# Patient Record
Sex: FEMALE | Race: WHITE | NOT HISPANIC OR LATINO | Employment: OTHER | URBAN - METROPOLITAN AREA
[De-identification: names, ages, dates, MRNs, and addresses within clinical notes are randomized per-mention and may not be internally consistent; named-entity substitution may affect disease eponyms.]

---

## 2023-09-17 PROBLEM — T17.928A ASPIRATION OF FOOD: Status: ACTIVE | Noted: 2023-09-17

## 2023-09-17 PROBLEM — I47.29 NSVT (NONSUSTAINED VENTRICULAR TACHYCARDIA) (MULTI): Status: ACTIVE | Noted: 2023-09-17

## 2023-09-17 PROBLEM — N28.1 RENAL CYST: Status: ACTIVE | Noted: 2023-09-17

## 2023-09-17 PROBLEM — D36.9 TUBULAR ADENOMA: Status: ACTIVE | Noted: 2023-09-17

## 2023-09-17 PROBLEM — H61.23 BILATERAL IMPACTED CERUMEN: Status: ACTIVE | Noted: 2023-09-17

## 2023-09-17 PROBLEM — I25.10 CAD (CORONARY ARTERY DISEASE): Status: ACTIVE | Noted: 2023-09-17

## 2023-09-17 PROBLEM — K92.1 MELENA: Status: ACTIVE | Noted: 2023-09-17

## 2023-09-17 PROBLEM — R20.9 SENSATION ALTERATION, LATE EFFECT OF CEREBROVASCULAR DISEASE: Status: ACTIVE | Noted: 2023-09-17

## 2023-09-17 PROBLEM — Z95.0 CARDIAC PACEMAKER IN SITU: Status: ACTIVE | Noted: 2023-09-17

## 2023-09-17 PROBLEM — H90.3 ASYMMETRICAL SENSORINEURAL HEARING LOSS: Status: ACTIVE | Noted: 2023-09-17

## 2023-09-17 PROBLEM — I07.1 TRICUSPID VALVE REGURGITATION: Status: ACTIVE | Noted: 2023-09-17

## 2023-09-17 PROBLEM — J31.0 CHRONIC RHINITIS: Status: ACTIVE | Noted: 2023-09-17

## 2023-09-17 PROBLEM — E78.2 MIXED HYPERLIPIDEMIA: Status: ACTIVE | Noted: 2023-09-17

## 2023-09-17 PROBLEM — I34.0 MITRAL VALVE REGURGITATION: Status: ACTIVE | Noted: 2023-09-17

## 2023-09-17 PROBLEM — I69.993 ATAXIA, LATE EFFECT OF CEREBROVASCULAR DISEASE: Status: ACTIVE | Noted: 2023-09-17

## 2023-09-17 PROBLEM — R31.0 HEMATURIA, GROSS: Status: ACTIVE | Noted: 2023-09-17

## 2023-09-17 PROBLEM — K29.50 ANTRITIS (STOMACH): Status: ACTIVE | Noted: 2023-09-17

## 2023-09-17 PROBLEM — I63.9 CVA (CEREBRAL VASCULAR ACCIDENT) (MULTI): Status: ACTIVE | Noted: 2023-09-17

## 2023-09-17 PROBLEM — I10 BENIGN ESSENTIAL HYPERTENSION: Status: ACTIVE | Noted: 2023-09-17

## 2023-09-17 PROBLEM — I48.0 PAF (PAROXYSMAL ATRIAL FIBRILLATION) (MULTI): Status: ACTIVE | Noted: 2023-09-17
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
## 2023-09-17 PROBLEM — I42.9 CARDIOMYOPATHY (MULTI): Status: ACTIVE | Noted: 2023-09-17

## 2023-09-17 PROBLEM — H61.20 CERUMEN IMPACTION: Status: ACTIVE | Noted: 2023-09-17

## 2023-09-17 PROBLEM — K57.90 DIVERTICULOSIS: Status: ACTIVE | Noted: 2023-09-17

## 2023-09-17 PROBLEM — R05.3 CHRONIC COUGH: Status: ACTIVE | Noted: 2023-09-17

## 2023-09-17 PROBLEM — I69.998 SENSATION ALTERATION, LATE EFFECT OF CEREBROVASCULAR DISEASE: Status: ACTIVE | Noted: 2023-09-17

## 2023-09-17 PROBLEM — I49.5 SINUS NODE DYSFUNCTION (MULTI): Status: ACTIVE | Noted: 2023-09-17

## 2023-09-17 PROBLEM — Z86.73 HISTORY OF CVA (CEREBROVASCULAR ACCIDENT): Status: ACTIVE | Noted: 2023-09-17

## 2023-09-17 RX ORDER — PANTOPRAZOLE SODIUM 40 MG/1
1 TABLET, DELAYED RELEASE ORAL DAILY PRN
COMMUNITY
End: 2023-10-18 | Stop reason: ALTCHOICE

## 2023-09-17 RX ORDER — DILTIAZEM HYDROCHLORIDE 240 MG/1
1 CAPSULE, EXTENDED RELEASE ORAL DAILY
COMMUNITY
Start: 2021-09-03 | End: 2024-01-04 | Stop reason: WASHOUT

## 2023-09-17 RX ORDER — CITALOPRAM 20 MG/1
1 TABLET, FILM COATED ORAL DAILY
COMMUNITY
Start: 2022-09-27 | End: 2024-05-16 | Stop reason: WASHOUT

## 2023-09-17 RX ORDER — MONTELUKAST SODIUM 10 MG/1
1 TABLET ORAL DAILY
COMMUNITY

## 2023-09-17 RX ORDER — NEOMYCIN SULFATE, POLYMYXIN B SULFATE, HYDROCORTISONE 3.5; 10000; 1 MG/ML; [USP'U]/ML; MG/ML
SOLUTION/ DROPS AURICULAR (OTIC)
COMMUNITY
Start: 2021-10-22 | End: 2023-10-18 | Stop reason: ALTCHOICE

## 2023-09-17 RX ORDER — SIMVASTATIN 20 MG/1
TABLET, FILM COATED ORAL
COMMUNITY
End: 2023-11-20

## 2023-09-17 RX ORDER — METOPROLOL SUCCINATE 200 MG/1
TABLET, EXTENDED RELEASE ORAL
COMMUNITY
Start: 2015-09-22 | End: 2023-11-20

## 2023-09-17 RX ORDER — CALCIUM CARBONATE/VITAMIN D3 500-10/5ML
1 LIQUID (ML) ORAL DAILY
COMMUNITY
End: 2024-05-16 | Stop reason: WASHOUT

## 2023-09-17 RX ORDER — TEMAZEPAM 7.5 MG/1
CAPSULE ORAL
COMMUNITY
End: 2023-10-18 | Stop reason: ALTCHOICE

## 2023-10-18 ENCOUNTER — OFFICE VISIT (OUTPATIENT)
Dept: CARDIOLOGY | Facility: CLINIC | Age: 88
End: 2023-10-18
Payer: MEDICARE

## 2023-10-18 VITALS
DIASTOLIC BLOOD PRESSURE: 82 MMHG | SYSTOLIC BLOOD PRESSURE: 132 MMHG | WEIGHT: 166.7 LBS | HEIGHT: 63 IN | BODY MASS INDEX: 29.54 KG/M2 | HEART RATE: 80 BPM

## 2023-10-18 DIAGNOSIS — I49.5 SINUS NODE DYSFUNCTION (MULTI): ICD-10-CM

## 2023-10-18 DIAGNOSIS — I10 BENIGN ESSENTIAL HYPERTENSION: ICD-10-CM

## 2023-10-18 DIAGNOSIS — Z86.73 HISTORY OF CVA (CEREBROVASCULAR ACCIDENT): ICD-10-CM

## 2023-10-18 DIAGNOSIS — I47.19 ATRIAL TACHYCARDIA, PAROXYSMAL (CMS-HCC): ICD-10-CM

## 2023-10-18 DIAGNOSIS — Z79.899 HIGH RISK MEDICATION USE: ICD-10-CM

## 2023-10-18 DIAGNOSIS — E78.2 MIXED HYPERLIPIDEMIA: ICD-10-CM

## 2023-10-18 DIAGNOSIS — I48.0 PAF (PAROXYSMAL ATRIAL FIBRILLATION) (MULTI): ICD-10-CM

## 2023-10-18 DIAGNOSIS — I47.29 NSVT (NONSUSTAINED VENTRICULAR TACHYCARDIA) (MULTI): ICD-10-CM

## 2023-10-18 DIAGNOSIS — I34.0 MITRAL VALVE INSUFFICIENCY, UNSPECIFIED ETIOLOGY: ICD-10-CM

## 2023-10-18 DIAGNOSIS — I07.1 TRICUSPID VALVE INSUFFICIENCY, UNSPECIFIED ETIOLOGY: ICD-10-CM

## 2023-10-18 DIAGNOSIS — I25.5 ISCHEMIC CARDIOMYOPATHY: ICD-10-CM

## 2023-10-18 PROCEDURE — 1036F TOBACCO NON-USER: CPT | Performed by: INTERNAL MEDICINE

## 2023-10-18 PROCEDURE — 3075F SYST BP GE 130 - 139MM HG: CPT | Performed by: INTERNAL MEDICINE

## 2023-10-18 PROCEDURE — 3079F DIAST BP 80-89 MM HG: CPT | Performed by: INTERNAL MEDICINE

## 2023-10-18 PROCEDURE — 1159F MED LIST DOCD IN RCRD: CPT | Performed by: INTERNAL MEDICINE

## 2023-10-18 PROCEDURE — 99214 OFFICE O/P EST MOD 30 MIN: CPT | Performed by: INTERNAL MEDICINE

## 2023-10-18 NOTE — PROGRESS NOTES
CARDIOLOGY OFFICE VISIT      CHIEF COMPLAINT  Chief Complaint   Patient presents with    Follow-up     1 year        HISTORY OF PRESENT ILLNESS  The patient is an 88-year-old  female with past medical history significant for paroxysmal atrial fibrillation, nonsustained ventricular tachycardia, cerebrovascular accident with multiple cerebral emboli, sick sinus syndrome, status post pacemaker, hypertension, hyperlipidemia, who presents for  followup cardiovascular care.      Denies chest pain, dyspnea, palpitations, nausea, vomiting, dizziness, near-syncope, henrry syncope, edema, bleeding. No formal exercise program.    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      PAST SURGICAL HISTORY: Pacemaker.      FAMILY HISTORY: No known premature coronary artery disease.      SOCIAL HISTORY: Denies tobacco, drug or alcohol use.      REVIEW OF SYSTEMS: Reviewed and are negative, noncontributory, or as previously  mentioned x12 systems.       ASSESSMENT:   Paroxysmal atrial fibrillation.  Cardiomyopathy EF 35-40%  Mitral valve regurgitation  Tricuspid valve regurgitation  Sick sinus syndrome, status post pacemaker.  Paroxysmal atrial tachycardia.  Nonsustained ventricular tachycardia.   History of multiple embolic cerebrovascular accidents.  Hypertension.  Hyperlipidemia.  High-risk medication management.      Recommendations:  Patient appears to be stable from a cardiac standpoint. No evidence of heart failure. No symptoms of angina. No symptomatic arrhythmia. No symptomatic valvular heart disease. Continue medical therapy as prescribed. Follow-up with myself in 1 year. Routine lab work through PCP.    Please excuse any errors in grammar or translation related to this dictation. Voice recognition software was utilized to prepare this document    Recent Cardiovascular Testing:  The following results have been reviewed and updated.     MPL: 8/6/19  1.Normal    2.EF 49%    Holter: 7/12/12  1. Brief episodes of atrial tachycardia.  2. Nonsustained ventricular tachycardia.     Labs: 10/15/23  1.T C 205, HDL 85, LDL 93, Trig 133    Echo: 9/3/21  1. EF 35-40%.  2. Moderate mitral valve regurgitation  3. Moderate tricuspid regurgitation  4. Mild aortic valve regurgitation         VITALS  Vitals:    10/18/23 1332   BP: 132/82   Pulse: 80     Wt Readings from Last 4 Encounters:   11/21/22 74.8 kg (165 lb)   10/19/22 75.3 kg (166 lb)   05/18/22 73.5 kg (162 lb)   10/20/21 70.3 kg (155 lb)       PHYSICAL EXAM:  GENERAL:  Well developed, well nourished, in no acute distress.  CHEST:  Symmetric and nontender.  NEURO/PSYCH:  Alert and oriented times three with approppriate behavior and responses.  NECK:  Supple, no JVD, no bruit.  LUNGS:  Clear to auscultation bilaterally, normal respiratory effort.  HEART:  Rate and rhythm irregularly irregular with no evident murmur, no gallop appreciated.                   There are no rubs, clicks or heaves.  EXTREMITIES:  Warm with good color, no clubbing or cyanosis.  There is  trace bilateral pretibia edema noted.  PERIPHERAL VASCULAR:  Pulses present and equally palpable; 2+ throughout.    ASSESSMENT AND PLAN  Problem List Items Addressed This Visit       Benign essential hypertension    Cardiomyopathy (CMS/HCC)    History of CVA (cerebrovascular accident)    Mixed hyperlipidemia    NSVT (nonsustained ventricular tachycardia) (CMS/HCC)    PAF (paroxysmal atrial fibrillation) (CMS/HCC)    Sinus node dysfunction (CMS/HCC)    Mitral valve regurgitation    Tricuspid valve regurgitation    Atrial tachycardia, paroxysmal    High risk medication use       Past Medical History  Past Medical History:   Diagnosis Date    Cardiomyopathy (CMS/HCC)     Hyperlipidemia     Hypertension     Mitral valve regurgitation     NSVT (nonsustained ventricular tachycardia) (CMS/HCC)     Other specified health status     No pertinent past medical history    PAF  "(paroxysmal atrial fibrillation) (CMS/HCC)     Personal history of other diseases of urinary system     History of hematuria    Sick sinus syndrome (CMS/HCC)     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    Sinus node dysfunction (CMS/HCC)     Tricuspid valve regurgitation        Social History  Social History     Tobacco Use    Smoking status: Never    Smokeless tobacco: Never   Substance Use Topics    Alcohol use: Never    Drug use: Never       Family History     Family History   Problem Relation Name Age of Onset    Hypertension Mother      Coronary artery disease Father      Hypertension Father      Other (arteriosclerotic cardiovascular disease) Father      Diabetes Brother          Allergies:  Allergies   Allergen Reactions    Ace Inhibitors Cough        Outpatient Medications:  Current Outpatient Medications   Medication Instructions    citalopram (CeleXA) 20 mg tablet 1 tablet, oral, Daily    dilTIAZem ER (Tiazac) 240 mg 24 hr capsule 1 capsule, oral, Daily    magnesium oxide 400 mg magnesium capsule 1 tablet, oral, Daily    metoprolol succinate XL (Toprol-XL) 200 mg 24 hr tablet oral    montelukast (Singulair) 10 mg tablet 1 tablet, oral, Daily    rivaroxaban (Xarelto) 15 mg tablet 1 tablet, oral, Daily    simvastatin (Zocor) 20 mg tablet oral        Recent Lab Results:    CMP:    Lab Results   Component Value Date     11/04/2022    K 4.2 11/04/2022     11/04/2022    CO2 31 11/04/2022    BUN 26 (H) 11/04/2022    CREATININE 1.54 (H) 11/04/2022    GLUCOSE 97 11/04/2022    CALCIUM 9.6 11/04/2022       Magnesium:    No results found for: \"MG\"    Lipid Profile:    Lab Results   Component Value Date    TRIG 116 11/04/2022    HDL 76.0 11/04/2022    LDLDIRECT 76 11/04/2022       TSH:    Lab Results   Component Value Date    TSH 4.20 (H) 11/04/2022       BNP:   No results found for: \"BNP\"     PT/INR:    Lab Results   Component Value Date    PROTIME 14.8 (H) 09/02/2021    INR 1.3 (H) 09/02/2021 " "      HgBA1c:    No results found for: \"HGBA1C\"    BMP:  Lab Results   Component Value Date     11/04/2022     05/19/2022     09/03/2021    K 4.2 11/04/2022    K 4.0 05/19/2022    K 3.9 09/03/2021     11/04/2022     05/19/2022     09/03/2021    CO2 31 11/04/2022    CO2 31 05/19/2022    CO2 27 09/03/2021    BUN 26 (H) 11/04/2022    BUN 25 (H) 05/19/2022    BUN 22 09/03/2021    CREATININE 1.54 (H) 11/04/2022    CREATININE 1.55 (H) 05/19/2022    CREATININE 1.21 (H) 09/03/2021       CBC:  Lab Results   Component Value Date    WBC 6.2 11/04/2022    WBC 6.2 05/19/2022    WBC 5.8 09/03/2021    RBC 4.46 11/04/2022    RBC 4.66 05/19/2022    RBC 4.25 09/03/2021    HGB 13.8 11/04/2022    HGB 14.6 05/19/2022    HGB 13.0 09/03/2021    HCT 44.3 11/04/2022    HCT 45.5 05/19/2022    HCT 41.7 09/03/2021    MCV 99 11/04/2022    MCV 98 05/19/2022    MCV 98 09/03/2021    MCHC 31.2 (L) 11/04/2022    MCHC 32.1 05/19/2022    MCHC 31.2 (L) 09/03/2021    RDW 13.3 11/04/2022    RDW 13.3 05/19/2022    RDW 13.2 09/03/2021     11/04/2022     05/19/2022     09/03/2021       Cardiac Enzymes:    No results found for: \"TROPHS\"    Hepatic Function Panel:    Lab Results   Component Value Date    ALKPHOS 98 11/04/2022    ALT 18 11/04/2022    AST 19 11/04/2022    PROT 6.6 11/04/2022    BILITOT 0.9 11/04/2022    BILIDIR 0.1 05/09/2019           Dorian Kern, DO        "

## 2023-11-17 DIAGNOSIS — I10 BENIGN ESSENTIAL HYPERTENSION: ICD-10-CM

## 2023-11-17 DIAGNOSIS — E78.2 MIXED HYPERLIPIDEMIA: ICD-10-CM

## 2023-11-17 DIAGNOSIS — I48.0 PAF (PAROXYSMAL ATRIAL FIBRILLATION) (MULTI): ICD-10-CM

## 2023-11-20 RX ORDER — SIMVASTATIN 20 MG/1
20 TABLET, FILM COATED ORAL NIGHTLY
Qty: 90 TABLET | Refills: 3 | Status: SHIPPED | OUTPATIENT
Start: 2023-11-20 | End: 2024-05-16 | Stop reason: WASHOUT

## 2023-11-20 RX ORDER — DILTIAZEM HYDROCHLORIDE 240 MG/1
240 CAPSULE, COATED, EXTENDED RELEASE ORAL DAILY
Qty: 90 CAPSULE | Refills: 3 | Status: SHIPPED | OUTPATIENT
Start: 2023-11-20

## 2023-11-20 RX ORDER — METOPROLOL SUCCINATE 200 MG/1
200 TABLET, EXTENDED RELEASE ORAL NIGHTLY
Qty: 90 TABLET | Refills: 3 | Status: SHIPPED | OUTPATIENT
Start: 2023-11-20

## 2023-11-20 RX ORDER — RIVAROXABAN 15 MG/1
15 TABLET, FILM COATED ORAL DAILY
Qty: 90 TABLET | Refills: 3 | Status: SHIPPED | OUTPATIENT
Start: 2023-11-20

## 2023-12-04 ENCOUNTER — HOSPITAL ENCOUNTER (OUTPATIENT)
Dept: CARDIOLOGY | Facility: HOSPITAL | Age: 88
Discharge: HOME | End: 2023-12-04
Payer: MEDICARE

## 2023-12-04 DIAGNOSIS — I49.5 SICK SINUS SYNDROME (MULTI): ICD-10-CM

## 2023-12-04 DIAGNOSIS — Z95.0 PACEMAKER: Primary | ICD-10-CM

## 2023-12-04 DIAGNOSIS — Z95.0 PACEMAKER: ICD-10-CM

## 2023-12-04 PROCEDURE — 93296 REM INTERROG EVL PM/IDS: CPT

## 2023-12-04 PROCEDURE — 93294 REM INTERROG EVL PM/LDLS PM: CPT | Performed by: INTERNAL MEDICINE

## 2024-01-04 ENCOUNTER — TELEPHONE (OUTPATIENT)
Dept: CARDIOLOGY | Facility: CLINIC | Age: 89
End: 2024-01-04
Payer: MEDICARE

## 2024-01-04 DIAGNOSIS — I48.0 PAF (PAROXYSMAL ATRIAL FIBRILLATION) (MULTI): ICD-10-CM

## 2024-01-04 DIAGNOSIS — I10 BENIGN ESSENTIAL HYPERTENSION: ICD-10-CM

## 2024-01-04 NOTE — TELEPHONE ENCOUNTER
Called patient and instructed her to call back with the correct name of the pharmacy she would like us to use.

## 2024-01-04 NOTE — TELEPHONE ENCOUNTER
"Pt stopped in office and filled out a refill form.  Pt is requesting refill on   Diltiazem 240 mg 1 daily  Metoprolol 200 mg daily  Montelukast 10 mg daily  Xarelto 15 mg 1 daily  Pt put on the paperwork for Rx to be sent to [facility].  Along with the Rx request is 1 sheet letter from [employer].  In this letter it states \"you should begin using [facility] pharmacies to fill your prescriptions\".  It advises pt's to \"look in your pharmacy directory or by calling our Customer Care department at the number a the end of this letter\".  We do not have a full copy of this letter so the number is not provided to the office.  Pt will need to contact Marco Vasco to find out what pharmacy she needs to go through.    Pt put contact number of [phone]. Attempted to contact pt 2 times.  No answer and vm is not set up.  Routed to Maile SCOTT RN.  "

## 2024-03-12 ENCOUNTER — HOSPITAL ENCOUNTER (OUTPATIENT)
Dept: CARDIOLOGY | Facility: HOSPITAL | Age: 89
Discharge: HOME | End: 2024-03-12
Payer: MEDICARE

## 2024-03-12 DIAGNOSIS — I49.5 SICK SINUS SYNDROME (MULTI): ICD-10-CM

## 2024-03-12 DIAGNOSIS — Z95.0 PACEMAKER: ICD-10-CM

## 2024-03-12 PROCEDURE — 93294 REM INTERROG EVL PM/LDLS PM: CPT | Performed by: INTERNAL MEDICINE

## 2024-03-12 PROCEDURE — 93296 REM INTERROG EVL PM/IDS: CPT

## 2024-05-16 ENCOUNTER — HOSPITAL ENCOUNTER (OUTPATIENT)
Dept: CARDIOLOGY | Facility: HOSPITAL | Age: 89
Discharge: HOME | End: 2024-05-16
Payer: MEDICARE

## 2024-05-16 ENCOUNTER — OFFICE VISIT (OUTPATIENT)
Dept: CARDIOLOGY | Facility: CLINIC | Age: 89
End: 2024-05-16
Payer: MEDICARE

## 2024-05-16 VITALS
BODY MASS INDEX: 29.59 KG/M2 | DIASTOLIC BLOOD PRESSURE: 64 MMHG | SYSTOLIC BLOOD PRESSURE: 122 MMHG | WEIGHT: 167 LBS | HEIGHT: 63 IN | HEART RATE: 75 BPM

## 2024-05-16 DIAGNOSIS — I34.0 MITRAL VALVE INSUFFICIENCY, UNSPECIFIED ETIOLOGY: ICD-10-CM

## 2024-05-16 DIAGNOSIS — I10 BENIGN ESSENTIAL HYPERTENSION: ICD-10-CM

## 2024-05-16 DIAGNOSIS — I47.29 NSVT (NONSUSTAINED VENTRICULAR TACHYCARDIA) (MULTI): ICD-10-CM

## 2024-05-16 DIAGNOSIS — Z95.0 PACEMAKER: ICD-10-CM

## 2024-05-16 DIAGNOSIS — I42.9 CARDIOMYOPATHY, UNSPECIFIED TYPE (MULTI): ICD-10-CM

## 2024-05-16 DIAGNOSIS — I48.0 PAF (PAROXYSMAL ATRIAL FIBRILLATION) (MULTI): ICD-10-CM

## 2024-05-16 DIAGNOSIS — Z95.0 CARDIAC PACEMAKER IN SITU: Primary | ICD-10-CM

## 2024-05-16 DIAGNOSIS — I07.1 TRICUSPID VALVE INSUFFICIENCY, UNSPECIFIED ETIOLOGY: ICD-10-CM

## 2024-05-16 DIAGNOSIS — Z79.899 HIGH RISK MEDICATION USE: ICD-10-CM

## 2024-05-16 DIAGNOSIS — E78.2 MIXED HYPERLIPIDEMIA: ICD-10-CM

## 2024-05-16 DIAGNOSIS — I49.5 SINUS NODE DYSFUNCTION (MULTI): ICD-10-CM

## 2024-05-16 DIAGNOSIS — I47.19 ATRIAL TACHYCARDIA, PAROXYSMAL (CMS-HCC): ICD-10-CM

## 2024-05-16 DIAGNOSIS — I49.5 SICK SINUS SYNDROME (MULTI): ICD-10-CM

## 2024-05-16 DIAGNOSIS — I25.10 CORONARY ARTERY DISEASE INVOLVING NATIVE CORONARY ARTERY OF NATIVE HEART WITHOUT ANGINA PECTORIS: ICD-10-CM

## 2024-05-16 PROCEDURE — 3078F DIAST BP <80 MM HG: CPT | Performed by: NURSE PRACTITIONER

## 2024-05-16 PROCEDURE — 1036F TOBACCO NON-USER: CPT | Performed by: NURSE PRACTITIONER

## 2024-05-16 PROCEDURE — 1159F MED LIST DOCD IN RCRD: CPT | Performed by: NURSE PRACTITIONER

## 2024-05-16 PROCEDURE — 93280 PM DEVICE PROGR EVAL DUAL: CPT

## 2024-05-16 PROCEDURE — 93280 PM DEVICE PROGR EVAL DUAL: CPT | Performed by: INTERNAL MEDICINE

## 2024-05-16 PROCEDURE — 99214 OFFICE O/P EST MOD 30 MIN: CPT | Performed by: NURSE PRACTITIONER

## 2024-05-16 PROCEDURE — 3074F SYST BP LT 130 MM HG: CPT | Performed by: NURSE PRACTITIONER

## 2024-05-16 PROCEDURE — 1160F RVW MEDS BY RX/DR IN RCRD: CPT | Performed by: NURSE PRACTITIONER

## 2024-05-16 RX ORDER — BISMUTH SUBSALICYLATE 262 MG
1 TABLET,CHEWABLE ORAL DAILY
COMMUNITY

## 2024-05-16 NOTE — PROGRESS NOTES
"CARDIOLOGY OFFICE VISIT      CHIEF COMPLAINT  Chief Complaint   Patient presents with    Device Check   Chief complaint: \"I feel fine.\"    HISTORY OF PRESENT ILLNESS  HPI  History: The patient is a 89-year-old  female who is followed for sinus node dysfunction status post dual-chamber pacemaker implant on October 5, 2015, paroxysmal atrial fibrillation and atrial tachycardia controlled on beta-blocker and calcium channel blocker and anticoagulated with Xarelto, dyslipidemia on statin, remote embolic stroke with no ongoing neurological deficits, coronary artery disease status post Lexiscan stress test dated January 11, 2016 revealing normal left ventricular function with no acute ischemic changes or infarct patterns, hypertension, and nonsustained ventricular tachycardia per device interrogation on beta-blockade.  She denies chest pain, palpitations, dizziness, lightheadedness, shortness of breath, abdominal distention, or lower extremity edema.  Past Medical History  Past Medical History:   Diagnosis Date    Cardiomyopathy (Multi)     Hyperlipidemia     Hypertension     Mitral valve regurgitation     NSVT (nonsustained ventricular tachycardia) (Multi)     Other specified health status     No pertinent past medical history    PAF (paroxysmal atrial fibrillation) (Multi)     Personal history of other diseases of urinary system     History of hematuria    Sick sinus syndrome (Multi)     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    Sinus node dysfunction (Multi)     Tricuspid valve regurgitation        Social History  Social History     Tobacco Use    Smoking status: Never    Smokeless tobacco: Never   Substance Use Topics    Alcohol use: Never    Drug use: Never       Family History     Family History   Problem Relation Name Age of Onset    Hypertension Mother      Coronary artery disease Father      Hypertension Father      Other (arteriosclerotic cardiovascular disease) Father      Diabetes Brother   "        Allergies:  Allergies   Allergen Reactions    Ace Inhibitors Cough        Outpatient Medications:  Current Outpatient Medications   Medication Instructions    ascorbic acid (VITAMIN C) 100 mg, oral, Daily    dilTIAZem CD (CARDIZEM CD) 240 mg, oral, Daily    metoprolol succinate XL (TOPROL-XL) 200 mg, oral, Nightly    montelukast (Singulair) 10 mg tablet 1 tablet, oral, Daily    multivitamin tablet 1 tablet, oral, Daily    simvastatin (ZOCOR) 20 mg, oral, Nightly    Xarelto 15 mg, oral, Daily          REVIEW OF SYSTEMS  Review of Systems   All other systems reviewed and are negative.        VITALS  Vitals:    05/16/24 1342   BP: 122/64   Pulse: 75       PHYSICAL EXAM  Vitals and nursing note reviewed.   Constitutional:       Appearance: Normal appearance.   HENT:      Head: Normocephalic.   Neck:      Vascular: No JVD.   Cardiovascular:      Rate and Rhythm: Normal rate and regular rhythm.      Pulses: Normal pulses.      Heart sounds: Normal heart sounds.   Pulmonary:      Effort: Pulmonary effort is normal.      Breath sounds: Normal breath sounds.   Abdominal:      General: Bowel sounds are normal.      Palpations: Abdomen is soft.   Musculoskeletal:         General: Normal range of motion.      Cervical back: Normal range of motion.   Skin:     General: Skin is warm and dry.  Left subclavian pacemaker pocket is well-healed without redness swelling or drainage.  Neurological:      General: No focal deficit present.      Mental Status: She is alert and oriented to person, place, and time.      Motor: Motor function is intact.   Psychiatric:         Attention and Perception: Attention and perception normal.         Mood and Affect: Mood and affect normal.         Speech: Speech normal.         Behavior: Behavior normal. Behavior is cooperative.         Thought Content: Thought content normal.         Cognition and Memory: Cognition and memory normal.     Labs and testing: Twelve-lead EKG reveals atrial pacing  and ventricular tracking at 75 bpm.  QRS durations 110 ms,  ms, QTc 448 ms.  No acute ischemic changes are noted.  Pacemaker interrogation dated May 16, 2024 reveals atrial pacing 99.6% and ventricular pacing 0.2%.  The A-fib burden is 0.2%.  9 nonsustained VT detections were noted with heart rates ranging from 156 to 207 bpm lasting 1 to 2 seconds in duration.  Most recent event was today, May 16, 2024 at 1 AM lasting 1 second with a heart rate of 182 bpm.  Estimated battery longevity is 1 year and 5 months.      ASSESSMENT AND PLAN       Clinical impressions:  1. Sinus node dysfunction status post dual-chamber pacemaker implant ([employer] advisor DR NEGRETE) on October 5, 2015.  2. Paroxysmal atrial fibrillation and atrial tachycardia controlled on calcium channel blocker and beta-blocker and anticoagulated with Xarelto.  3. Embolic CVA with no ongoing neurological deficits.  4. Coronary artery disease with Lexiscan stress test dated January 11, 2016 revealing normal left ventricular function and no acute ischemic changes or infarct patterns.  5. Dyslipidemia on statin.  6. Hypertension, controlled with a blood pressure today 122/64.  7. Nonsustained ventricular tachycardia per device interrogation controlled on beta-blockade.  8. Overweight with a BMI of 29.54.     Recommendations:  1.  Continue current medications as prescribed.  2.  Obtain a remote device interrogation on August 26, 2024.  3.  Obtain an in clinic check in 6 months prior to the office visit with Dr. Baca.  4.  Follow-up in office with Dr. Baca in 6 months or sooner if needed.  5.  Follow-up in office with Dr. Kern on October 21, 2024 at 1 PM schedule or sooner if needed.  6.  Continue lifestyle modifications as discussed.    Evaluation and note by Geetha Graham CNP  **Please excuse any errors in grammar or translation related to this dictation.  Voice recognition software was utilized to prepare this document.**

## 2024-08-29 ENCOUNTER — HOSPITAL ENCOUNTER (OUTPATIENT)
Dept: CARDIOLOGY | Facility: HOSPITAL | Age: 89
Discharge: HOME | End: 2024-08-29
Payer: MEDICARE

## 2024-08-29 DIAGNOSIS — Z95.0 PACEMAKER: ICD-10-CM

## 2024-08-29 DIAGNOSIS — I49.5 SICK SINUS SYNDROME (MULTI): ICD-10-CM

## 2024-08-29 PROCEDURE — 93296 REM INTERROG EVL PM/IDS: CPT

## 2024-08-29 PROCEDURE — 93294 REM INTERROG EVL PM/LDLS PM: CPT | Performed by: INTERNAL MEDICINE

## 2024-10-21 ENCOUNTER — APPOINTMENT (OUTPATIENT)
Dept: CARDIOLOGY | Facility: CLINIC | Age: 89
End: 2024-10-21
Payer: MEDICARE

## 2024-10-21 VITALS
SYSTOLIC BLOOD PRESSURE: 120 MMHG | HEIGHT: 63 IN | WEIGHT: 162 LBS | BODY MASS INDEX: 28.7 KG/M2 | HEART RATE: 86 BPM | DIASTOLIC BLOOD PRESSURE: 66 MMHG

## 2024-10-21 DIAGNOSIS — I48.0 PAF (PAROXYSMAL ATRIAL FIBRILLATION) (MULTI): ICD-10-CM

## 2024-10-21 DIAGNOSIS — I47.19 ATRIAL TACHYCARDIA, PAROXYSMAL (CMS-HCC): ICD-10-CM

## 2024-10-21 DIAGNOSIS — I47.29 NSVT (NONSUSTAINED VENTRICULAR TACHYCARDIA) (MULTI): ICD-10-CM

## 2024-10-21 DIAGNOSIS — I07.1 TRICUSPID VALVE INSUFFICIENCY, UNSPECIFIED ETIOLOGY: ICD-10-CM

## 2024-10-21 DIAGNOSIS — I42.9 CARDIOMYOPATHY, UNSPECIFIED TYPE (MULTI): ICD-10-CM

## 2024-10-21 DIAGNOSIS — I34.0 MITRAL VALVE INSUFFICIENCY, UNSPECIFIED ETIOLOGY: ICD-10-CM

## 2024-10-21 DIAGNOSIS — I49.5 SINUS NODE DYSFUNCTION (MULTI): ICD-10-CM

## 2024-10-21 DIAGNOSIS — I10 BENIGN ESSENTIAL HYPERTENSION: ICD-10-CM

## 2024-10-21 DIAGNOSIS — I63.9 CEREBROVASCULAR ACCIDENT (CVA), UNSPECIFIED MECHANISM (MULTI): ICD-10-CM

## 2024-10-21 DIAGNOSIS — Z78.9 NEVER SMOKED TOBACCO: ICD-10-CM

## 2024-10-21 DIAGNOSIS — E78.2 MIXED HYPERLIPIDEMIA: ICD-10-CM

## 2024-10-21 DIAGNOSIS — Z79.899 HIGH RISK MEDICATION USE: ICD-10-CM

## 2024-10-21 DIAGNOSIS — Z95.0 CARDIAC PACEMAKER IN SITU: ICD-10-CM

## 2024-10-21 PROCEDURE — 1159F MED LIST DOCD IN RCRD: CPT | Performed by: INTERNAL MEDICINE

## 2024-10-21 PROCEDURE — 99214 OFFICE O/P EST MOD 30 MIN: CPT | Performed by: INTERNAL MEDICINE

## 2024-10-21 PROCEDURE — 3078F DIAST BP <80 MM HG: CPT | Performed by: INTERNAL MEDICINE

## 2024-10-21 PROCEDURE — 1036F TOBACCO NON-USER: CPT | Performed by: INTERNAL MEDICINE

## 2024-10-21 PROCEDURE — 3074F SYST BP LT 130 MM HG: CPT | Performed by: INTERNAL MEDICINE

## 2024-10-21 RX ORDER — DULOXETIN HYDROCHLORIDE 30 MG/1
1 CAPSULE, DELAYED RELEASE ORAL
COMMUNITY
Start: 2024-07-19

## 2024-10-21 RX ORDER — SIMVASTATIN 20 MG/1
TABLET, FILM COATED ORAL
COMMUNITY
Start: 2024-08-12

## 2024-10-21 RX ORDER — RAMELTEON 8 MG/1
8 TABLET ORAL NIGHTLY
COMMUNITY
Start: 2024-05-06

## 2024-10-21 NOTE — PATIENT INSTRUCTIONS
Patient to follow up in 1 year with Dr. Kern, DO     No changes today.   Continue same medications and treatments.   Patient educated on proper medication use.   Patient educated on risk factor modification.   Please bring any lab results from other providers / physicians to your next appointment.     Please bring all medicines, vitamins, and herbal supplements with you when you come to the office.     Prescriptions will not be filled unless you are compliant with your follow up appointments or have a follow up appointment scheduled as per instruction of your physician. Refills should be requested at the time of your visit.    I, René De Souza RN am scribing for and in the presence of Dr. Dorian Kern, DO

## 2024-10-21 NOTE — PROGRESS NOTES
CARDIOLOGY OFFICE VISIT      CHIEF COMPLAINT  Chief Complaint   Patient presents with    Follow-up     Patient is present for 1 year follow up         HISTORY OF PRESENT ILLNESS  The patient is an 89-year-old  female with past medical history significant for paroxysmal atrial fibrillation, nonsustained ventricular tachycardia, cerebrovascular accident with multiple cerebral emboli, sick sinus syndrome, status post pacemaker, hypertension, hyperlipidemia, who presents for  followup cardiovascular care.       Denies chest pain, dyspnea, palpitations, nausea, vomiting, dizziness, near-syncope, henrry syncope, edema, bleeding. No formal exercise program.  Patient's primary complaint is left leg pain.   She was seen by NP and given steroids and started a physical therapy program.  She has a spine surgeon consult in the future.     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      PAST SURGICAL HISTORY: Pacemaker.      FAMILY HISTORY: No known premature coronary artery disease.      SOCIAL HISTORY: Denies tobacco, drug or alcohol use.      REVIEW OF SYSTEMS: Reviewed and are negative, noncontributory, or as previously  mentioned x12 systems.         ASSESSMENT:   Paroxysmal atrial fibrillation-Dr. Baca  Cardiomyopathy EF 35-40%  Mitral valve regurgitation  Tricuspid valve regurgitation  Sick sinus syndrome, status post pacemaker.  Paroxysmal atrial tachycardia.  Nonsustained ventricular tachycardia.   History of multiple embolic cerebrovascular accidents.  Hypertension.  Hyperlipidemia.  High-risk medication management.        Recommendations:  Patient appears to be stable from a cardiac standpoint. No evidence of heart failure. No symptoms of angina. No symptomatic arrhythmia. No symptomatic valvular heart disease. Continue medical therapy as prescribed. Follow-up with myself in 1 year. Routine lab work through PCP.     Please excuse any errors in grammar  or translation related to this dictation. Voice recognition software was utilized to prepare this document    Recent Cardiovascular Testing:  The following results have been reviewed and updated.   MPL: 8/6/19  1.Normal   2.EF 49%     Holter: 7/12/12  1. Brief episodes of atrial tachycardia.  2. Nonsustained ventricular tachycardia.     Labs: 6/11/2024  1.T C 182, HDL 82, LDL 66, Trig 168     Echo: 9/3/21  1. EF 35-40%.  2. Moderate mitral valve regurgitation  3. Moderate tricuspid regurgitation  4. Mild aortic valve regurgitation         VITALS  Vitals:    10/21/24 1300   BP: 120/66   Pulse: 86     Wt Readings from Last 4 Encounters:   10/21/24 73.5 kg (162 lb)   05/16/24 75.8 kg (167 lb)   10/18/23 75.6 kg (166 lb 11.2 oz)   11/21/22 74.8 kg (165 lb)       PHYSICAL EXAM:  GENERAL:  Well developed, well nourished, in no acute distress. Wheelchair for assistance   CHEST:  Symmetric and nontender.  NEURO/PSYCH:  Alert and oriented times three with approppriate behavior and responses.  NECK:  Supple, no JVD, no bruit.  LUNGS:  Clear to auscultation bilaterally, normal respiratory effort.  HEART:  Rate and rhythm REGULAR with no evident murmur, no gallop appreciated.    There are no rubs, clicks or heaves.  EXTREMITIES:  Warm with good color, no clubbing or cyanosis.  There is no edema noted.  PERIPHERAL VASCULAR:  Pulses present and equally palpable; 2+ throughout.    ASSESSMENT AND PLAN  Diagnoses and all orders for this visit:  PAF (paroxysmal atrial fibrillation) (Multi)  Cardiomyopathy, unspecified type (Multi)  Mitral valve insufficiency, unspecified etiology  Tricuspid valve insufficiency, unspecified etiology  Cardiac pacemaker in situ  Sinus node dysfunction (Multi)  Atrial tachycardia, paroxysmal (CMS-HCC)  NSVT (nonsustained ventricular tachycardia) (Multi)  Cerebrovascular accident (CVA), unspecified mechanism (Multi)  Benign essential hypertension  Mixed hyperlipidemia  High risk medication use  BMI  "28.0-28.9,adult  Never smoked tobacco      Past Medical History  Past Medical History:   Diagnosis Date    Cardiomyopathy     Hyperlipidemia     Hypertension     Mitral valve regurgitation     NSVT (nonsustained ventricular tachycardia) (Multi)     Other specified health status     No pertinent past medical history    PAF (paroxysmal atrial fibrillation) (Multi)     Personal history of other diseases of urinary system     History of hematuria    Sick sinus syndrome (Multi)     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    Sinus node dysfunction (Multi)     Tricuspid valve regurgitation        Social History  Social History     Tobacco Use    Smoking status: Never    Smokeless tobacco: Never   Substance Use Topics    Alcohol use: Never    Drug use: Never       Family History     Family History   Problem Relation Name Age of Onset    Hypertension Mother      Coronary artery disease Father      Hypertension Father      Other (arteriosclerotic cardiovascular disease) Father      Diabetes Brother          Allergies:  Allergies   Allergen Reactions    Ace Inhibitors Cough        Outpatient Medications:  Current Outpatient Medications   Medication Instructions    dilTIAZem CD (CARDIZEM CD) 240 mg, oral, Daily    DULoxetine (Cymbalta) 30 mg DR capsule 1 capsule, Daily (0630)    metoprolol succinate XL (TOPROL-XL) 200 mg, oral, Nightly    multivitamin tablet 1 tablet, Daily    ramelteon (ROZEREM) 8 mg, Nightly    simvastatin (Zocor) 20 mg tablet     Xarelto 15 mg, oral, Daily        Recent Lab Results:    CMP:    Lab Results   Component Value Date     11/04/2022    K 4.2 11/04/2022     11/04/2022    CO2 31 11/04/2022    BUN 26 (H) 11/04/2022    CREATININE 1.54 (H) 11/04/2022    GLUCOSE 97 11/04/2022    CALCIUM 9.6 11/04/2022       Magnesium:    No results found for: \"MG\"    Lipid Profile:    Lab Results   Component Value Date    TRIG 116 11/04/2022    HDL 76.0 11/04/2022    LDLDIRECT 76 11/04/2022       TSH:  " "  Lab Results   Component Value Date    TSH 4.20 (H) 11/04/2022       BNP:   No results found for: \"BNP\"     PT/INR:    Lab Results   Component Value Date    PROTIME 14.8 (H) 09/02/2021    INR 1.3 (H) 09/02/2021       HgBA1c:    No results found for: \"HGBA1C\"    BMP:  Lab Results   Component Value Date     11/04/2022     05/19/2022     09/03/2021    K 4.2 11/04/2022    K 4.0 05/19/2022    K 3.9 09/03/2021     11/04/2022     05/19/2022     09/03/2021    CO2 31 11/04/2022    CO2 31 05/19/2022    CO2 27 09/03/2021    BUN 26 (H) 11/04/2022    BUN 25 (H) 05/19/2022    BUN 22 09/03/2021    CREATININE 1.54 (H) 11/04/2022    CREATININE 1.55 (H) 05/19/2022    CREATININE 1.21 (H) 09/03/2021       CBC:  Lab Results   Component Value Date    WBC 6.2 11/04/2022    WBC 6.2 05/19/2022    WBC 5.8 09/03/2021    RBC 4.46 11/04/2022    RBC 4.66 05/19/2022    RBC 4.25 09/03/2021    HGB 13.8 11/04/2022    HGB 14.6 05/19/2022    HGB 13.0 09/03/2021    HCT 44.3 11/04/2022    HCT 45.5 05/19/2022    HCT 41.7 09/03/2021    MCV 99 11/04/2022    MCV 98 05/19/2022    MCV 98 09/03/2021    MCHC 31.2 (L) 11/04/2022    MCHC 32.1 05/19/2022    MCHC 31.2 (L) 09/03/2021    RDW 13.3 11/04/2022    RDW 13.3 05/19/2022    RDW 13.2 09/03/2021     11/04/2022     05/19/2022     09/03/2021       Cardiac Enzymes:    No results found for: \"TROPHS\"    Hepatic Function Panel:    Lab Results   Component Value Date    ALKPHOS 98 11/04/2022    ALT 18 11/04/2022    AST 19 11/04/2022    PROT 6.6 11/04/2022    BILITOT 0.9 11/04/2022    BILIDIR 0.1 05/09/2019           Dorian Kern, DO        "

## 2024-11-11 ENCOUNTER — APPOINTMENT (OUTPATIENT)
Dept: CARDIOLOGY | Facility: CLINIC | Age: 89
End: 2024-11-11
Payer: MEDICARE

## 2024-11-11 ENCOUNTER — HOSPITAL ENCOUNTER (OUTPATIENT)
Dept: CARDIOLOGY | Facility: HOSPITAL | Age: 89
Discharge: HOME | End: 2024-11-11
Payer: MEDICARE

## 2024-11-11 VITALS
HEIGHT: 63 IN | BODY MASS INDEX: 28.7 KG/M2 | SYSTOLIC BLOOD PRESSURE: 130 MMHG | HEART RATE: 72 BPM | DIASTOLIC BLOOD PRESSURE: 70 MMHG | WEIGHT: 162 LBS

## 2024-11-11 DIAGNOSIS — I49.5 SINUS NODE DYSFUNCTION (MULTI): ICD-10-CM

## 2024-11-11 DIAGNOSIS — Z78.9 NEVER SMOKED TOBACCO: ICD-10-CM

## 2024-11-11 DIAGNOSIS — Z95.0 CARDIAC PACEMAKER IN SITU: Primary | ICD-10-CM

## 2024-11-11 DIAGNOSIS — Z95.0 CARDIAC PACEMAKER IN SITU: ICD-10-CM

## 2024-11-11 DIAGNOSIS — Z51.81 ANTICOAGULATION MANAGEMENT ENCOUNTER: ICD-10-CM

## 2024-11-11 DIAGNOSIS — Z79.01 ANTICOAGULATION MANAGEMENT ENCOUNTER: ICD-10-CM

## 2024-11-11 DIAGNOSIS — Z79.899 HIGH RISK MEDICATION USE: ICD-10-CM

## 2024-11-11 DIAGNOSIS — I10 BENIGN ESSENTIAL HYPERTENSION: ICD-10-CM

## 2024-11-11 DIAGNOSIS — I48.11 LONGSTANDING PERSISTENT ATRIAL FIBRILLATION (MULTI): ICD-10-CM

## 2024-11-11 PROCEDURE — 93280 PM DEVICE PROGR EVAL DUAL: CPT | Performed by: INTERNAL MEDICINE

## 2024-11-11 PROCEDURE — 3078F DIAST BP <80 MM HG: CPT | Performed by: INTERNAL MEDICINE

## 2024-11-11 PROCEDURE — 99214 OFFICE O/P EST MOD 30 MIN: CPT | Performed by: INTERNAL MEDICINE

## 2024-11-11 PROCEDURE — 93000 ELECTROCARDIOGRAM COMPLETE: CPT | Mod: DISTINCT PROCEDURAL SERVICE | Performed by: INTERNAL MEDICINE

## 2024-11-11 PROCEDURE — 1036F TOBACCO NON-USER: CPT | Performed by: INTERNAL MEDICINE

## 2024-11-11 PROCEDURE — 93280 PM DEVICE PROGR EVAL DUAL: CPT

## 2024-11-11 PROCEDURE — 1159F MED LIST DOCD IN RCRD: CPT | Performed by: INTERNAL MEDICINE

## 2024-11-11 PROCEDURE — 3075F SYST BP GE 130 - 139MM HG: CPT | Performed by: INTERNAL MEDICINE

## 2024-11-11 ASSESSMENT — ENCOUNTER SYMPTOMS
DYSPNEA ON EXERTION: 0
PALPITATIONS: 0

## 2024-11-11 NOTE — PATIENT INSTRUCTIONS
Continue same medications/treatment.  Patient educated on proper medication use.  Patient educated on risk factor modification.  Please bring any lab results from other providers/physicians to your next appointment.    Please bring all medicines, vitamins, and herbal supplements with you when you come to the office.    Prescriptions will not be filled unless you are compliant with your follow up appointments or have a follow up appointment scheduled as per instruction of your physician. Refills should be requested at the time of your visit.    Follow up with Dr. Rivas in 6 months with device check  Continue remote checks every 2 months until generator change out    Maile MEJIAS RN, AM SCRIBING FOR, AND IN THE PRESENCE OF DR. BRENDAN RIVAS MD

## 2024-11-11 NOTE — PROGRESS NOTES
CARDIOLOGY OFFICE VISIT      CHIEF COMPLAINT  Chief Complaint   Patient presents with    Follow-up     Patient is present for 6 month follow up with [facility].        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  She denies any symptoms of chest pain or shortness of breath or palpitations.    EKG performed today shows atrial paced rhythm at rate of 72 bpm  ms QT corrected 153 ms.  Rhythm strip shows the same pattern.    Patient had a device interrogation today at the device clinic and it shows dual-chamber pacemaker Medtronic with battery longevity 1 year 1 month.  [place] of atrial ration 0.1% of the time.  21 nonsustained ventricular tachycardia episodes brief.      Past Medical History  Past Medical History:   Diagnosis Date    Cardiomyopathy     Hyperlipidemia     Hypertension     Mitral valve regurgitation     NSVT (nonsustained ventricular tachycardia) (Multi)     Other specified health status     No pertinent past medical history    PAF (paroxysmal atrial fibrillation) (Multi)     Personal history of other diseases of urinary system     History of hematuria    Sick sinus syndrome (Multi)     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    Sinus node dysfunction (Multi)     Tricuspid valve regurgitation        Social History  Social History      Tobacco Use    Smoking status: Never    Smokeless tobacco: Never   Substance Use Topics    Alcohol use: Never    Drug use: Never       Family History     Family History   Problem Relation Name Age of Onset    Hypertension Mother      Coronary artery disease Father      Hypertension Father      Other (arteriosclerotic cardiovascular disease) Father      Diabetes Brother          Allergies:  Allergies   Allergen Reactions    Ace Inhibitors Cough        Outpatient Medications:  Current Outpatient Medications   Medication Instructions    dilTIAZem CD (CARDIZEM CD) 240 mg, oral, Daily    DULoxetine (Cymbalta) 30 mg DR capsule 1 capsule, Daily (0630)    metoprolol succinate XL (TOPROL-XL) 200 mg, oral, Nightly    multivitamin tablet 1 tablet, Daily    ramelteon (ROZEREM) 8 mg, Nightly    simvastatin (Zocor) 20 mg tablet     Xarelto 15 mg, oral, Daily          REVIEW OF SYSTEMS  Review of Systems   Cardiovascular:  Negative for chest pain, dyspnea on exertion and palpitations.   All other systems reviewed and are negative.        VITALS  Vitals:    11/11/24 1422   BP: 130/70   Pulse: 72       PHYSICAL EXAM  Constitutional:       General: Awake.      Appearance: Normal and healthy appearance. Well-developed and not in distress.   Neck:      Vascular: No JVR. JVD normal.   Pulmonary:      Effort: Pulmonary effort is normal.      Breath sounds: Normal breath sounds. No wheezing. No rhonchi. No rales.   Chest:      Chest wall: Not tender to palpatation.      Comments: Left sided device pocket- healed and well approximated. No swelling or hematoma      Cardiovascular:      PMI at left midclavicular line. Normal rate. Regular rhythm. Normal S1. Normal S2.       Murmurs: There is no murmur.      No gallop.  No click. No rub.   Pulses:     Intact distal pulses.   Edema:     Peripheral edema absent.   Abdominal:      Tenderness: There is no abdominal tenderness.   Musculoskeletal: Normal range of motion.         General: No  tenderness. Skin:     General: Skin is warm and dry.   Neurological:      General: No focal deficit present.      Mental Status: Alert and oriented to person, place and time.           ASSESSMENT AND PLAN    Clinical impressions:  1. Sinus node dysfunction status post dual-chamber pacemaker implant (Medtronic advisor DR NEGRETE) on October 5, 2015.  2. Paroxysmal atrial fibrillation and atrial tachycardia controlled on calcium channel blocker and beta-blocker and anticoagulated with Xarelto.  3. Embolic CVA with no ongoing neurological deficits.  4. Coronary artery disease with Lexiscan stress test dated January 11, 2016 revealing normal left ventricular function and no acute ischemic changes or infarct patterns.  5. Dyslipidemia on statin.  6. Hypertension, controlled .  7. Nonsustained ventricular tachycardia per device interrogation controlled on beta-blockade.  8. Overweight with a BMI of 29.54.    Plan recommendations    From the electrophysiologist on point she is doing well.  Will continue with follow-up my office every 6 months or sooner if needed.    Continue with Xarelto therapy.    Will continue watching burden of atrial ventricular arrhythmias by device interrogation.    Risk factor modification and lifestyle modification discussed with patient. Diet , exercise and hydration discussed with patient.    I have personally review with patient during this office visit, laboratory data, echocardiogram results, stress test results, Holter-event monitor results prior and after the last electrophysiology visit. All questions has been answered.    Please excuse any errors in grammar or translation related to this dictation.  Voice recognition software was utilized to prepare this document.

## 2024-11-20 ENCOUNTER — APPOINTMENT (OUTPATIENT)
Dept: CARDIOLOGY | Facility: CLINIC | Age: 89
End: 2024-11-20
Payer: MEDICARE

## 2024-11-20 ENCOUNTER — APPOINTMENT (OUTPATIENT)
Dept: CARDIOLOGY | Facility: HOSPITAL | Age: 89
End: 2024-11-20
Payer: MEDICARE

## 2024-11-27 DIAGNOSIS — I48.0 PAF (PAROXYSMAL ATRIAL FIBRILLATION) (MULTI): ICD-10-CM

## 2024-11-27 DIAGNOSIS — I10 BENIGN ESSENTIAL HYPERTENSION: ICD-10-CM

## 2024-11-27 RX ORDER — DILTIAZEM HYDROCHLORIDE 240 MG/1
240 CAPSULE, COATED, EXTENDED RELEASE ORAL DAILY
Qty: 90 CAPSULE | Refills: 3 | Status: CANCELLED | OUTPATIENT
Start: 2024-11-27

## 2024-11-27 NOTE — TELEPHONE ENCOUNTER
Received request for prescription refills for patient.   Patient follows with Dr. Baca     Request is for Diltiazem Cd Cap 240mg/24  Is patient currently on medication yes     Last OV 11/11/24  Next OV 05/28/25    Pended for signing and sent to provider

## 2024-12-12 DIAGNOSIS — I48.0 PAF (PAROXYSMAL ATRIAL FIBRILLATION) (MULTI): ICD-10-CM

## 2024-12-12 DIAGNOSIS — I10 BENIGN ESSENTIAL HYPERTENSION: ICD-10-CM

## 2024-12-12 RX ORDER — DILTIAZEM HYDROCHLORIDE 240 MG/1
240 CAPSULE, COATED, EXTENDED RELEASE ORAL DAILY
Qty: 90 CAPSULE | Refills: 3 | Status: SHIPPED | OUTPATIENT
Start: 2024-12-12

## 2024-12-12 NOTE — TELEPHONE ENCOUNTER
Pharmacy called requesting refill of diltiazem 240mg daily. Order placed and sent to physician for signature.

## 2024-12-16 DIAGNOSIS — I48.0 PAF (PAROXYSMAL ATRIAL FIBRILLATION) (MULTI): ICD-10-CM

## 2024-12-16 NOTE — TELEPHONE ENCOUNTER
Received request for prescription refills for patient.   Patient follows with Dr. Baca     Request is for Xarelto 15 mg   Is patient currently on medication yes    Last OV 11/11/24  Next OV 5/28/25    Pended for signing and sent to provider    Pharmacy is [facility]

## 2024-12-19 DIAGNOSIS — I48.0 PAF (PAROXYSMAL ATRIAL FIBRILLATION) (MULTI): ICD-10-CM

## 2024-12-19 NOTE — TELEPHONE ENCOUNTER
Patient called requesting a refill of xarelto 15mg daily. Order placed and sent to physician for signature.

## 2025-02-20 DIAGNOSIS — I10 BENIGN ESSENTIAL HYPERTENSION: ICD-10-CM

## 2025-02-20 RX ORDER — METOPROLOL SUCCINATE 200 MG/1
200 TABLET, EXTENDED RELEASE ORAL NIGHTLY
Qty: 90 TABLET | Refills: 3 | Status: SHIPPED | OUTPATIENT
Start: 2025-02-20 | End: 2026-02-20

## 2025-02-20 NOTE — TELEPHONE ENCOUNTER
Received request for prescription refill for patient.  Patient follows with Dr. Dorian Kern, DO     Request is for metoprolol   Is patient currently on medication- yes    Last OV- 10/21/24  Next OV- 10/22/25    Pended for signing and sent to provider.

## 2025-02-27 ENCOUNTER — HOSPITAL ENCOUNTER (OUTPATIENT)
Dept: CARDIOLOGY | Facility: HOSPITAL | Age: 89
Discharge: HOME | End: 2025-02-27
Payer: MEDICARE

## 2025-02-27 DIAGNOSIS — Z95.0 CARDIAC PACEMAKER IN SITU: ICD-10-CM

## 2025-02-27 PROCEDURE — 93296 REM INTERROG EVL PM/IDS: CPT

## 2025-02-27 PROCEDURE — 93294 REM INTERROG EVL PM/LDLS PM: CPT | Performed by: INTERNAL MEDICINE

## 2025-05-28 ENCOUNTER — APPOINTMENT (OUTPATIENT)
Dept: CARDIOLOGY | Facility: CLINIC | Age: 89
End: 2025-05-28
Payer: MEDICARE

## 2025-05-28 ENCOUNTER — OFFICE VISIT (OUTPATIENT)
Dept: CARDIOLOGY | Facility: CLINIC | Age: 89
End: 2025-05-28
Payer: MEDICARE

## 2025-05-28 ENCOUNTER — HOSPITAL ENCOUNTER (OUTPATIENT)
Dept: CARDIOLOGY | Facility: HOSPITAL | Age: 89
Discharge: HOME | End: 2025-05-28
Payer: MEDICARE

## 2025-05-28 VITALS
BODY MASS INDEX: 29.41 KG/M2 | SYSTOLIC BLOOD PRESSURE: 116 MMHG | HEIGHT: 63 IN | HEART RATE: 75 BPM | DIASTOLIC BLOOD PRESSURE: 66 MMHG | WEIGHT: 166 LBS

## 2025-05-28 DIAGNOSIS — I48.0 PAF (PAROXYSMAL ATRIAL FIBRILLATION) (MULTI): ICD-10-CM

## 2025-05-28 DIAGNOSIS — Z79.01 CHRONIC ANTICOAGULATION: ICD-10-CM

## 2025-05-28 DIAGNOSIS — Z95.0 PRESENCE OF CARDIAC PACEMAKER: Primary | ICD-10-CM

## 2025-05-28 DIAGNOSIS — Z95.0 CARDIAC PACEMAKER IN SITU: ICD-10-CM

## 2025-05-28 DIAGNOSIS — Z86.73 HISTORY OF EMBOLIC STROKE: ICD-10-CM

## 2025-05-28 DIAGNOSIS — I47.29 NSVT (NONSUSTAINED VENTRICULAR TACHYCARDIA) (MULTI): ICD-10-CM

## 2025-05-28 PROCEDURE — 93000 ELECTROCARDIOGRAM COMPLETE: CPT | Mod: DISTINCT PROCEDURAL SERVICE | Performed by: INTERNAL MEDICINE

## 2025-05-28 PROCEDURE — 93280 PM DEVICE PROGR EVAL DUAL: CPT | Performed by: INTERNAL MEDICINE

## 2025-05-28 PROCEDURE — 93280 PM DEVICE PROGR EVAL DUAL: CPT

## 2025-05-28 RX ORDER — RAMELTEON 8 MG/1
8 TABLET ORAL NIGHTLY
COMMUNITY

## 2025-05-28 NOTE — PROGRESS NOTES
Electrophysiology Office Visit     CHIEF COMPLAINT  Chief Complaint   Patient presents with    Follow-up     6 month with device check      Primary EP doctor: Dr Baca  Primary Cardiologist: Dr Kern    EP History: Sinus node dysfunction s/p PPM (2015), pAF/AT on CCB, BB, and Xarelto, nsVT on interrogation, Hx multi-infarct bilateral embolic CVA in 2015 (found to have tachy-magali arrythmias/ SSS), HTN  9/14/2015 - 9/22/2015 Pt found unresponsive. Admit for multi-infarct bilateral embolic vertebrobasilar CVA (L temporal, BL occipital, BL thalamic subacute strokes). 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 Started on 81mg ASA and Plavix. On 9/20 it was noted that she had pAF on monitor.   10/5/2015 Pacemaker implanted due to sinus node dysfunction  9/3/2021 Admit with dizziness. Found to have AF RVR. Started on Cardizem drip and oral metoprolol. Echo 30-35%. Medically managed.   Maintained on metoprolol succinate 200 mg nightly, diltiazem  mg daily, Xarelto 15 mg daily    HPI: 5/28/2025  89 year old female pmhx sinus node dysfunction s/p PPM (2015), pAF/AT on CCB, BB, and Xarelto, nsVT on interrogation, Hx multi-infarct bilateral embolic CVA in 2015 (found to have tachy-magali arrythmias/ SSS), HTN. Maintained on metoprolol succinate 200 mg nightly, diltiazem  mg daily, Xarelto 15 mg daily.    Here for pacemaker check.  Medtronic A2 DR Novak advisor DR NEGRETE, remaining battery longevity is 6 months.  Implanted due to sick sinus syndrome.  RA pacing 97.21%, RV pacing 0.17%, 0 AT AF episodes with 0% burden.  On Xarelto.  31 NSVT detections.    She is unaware of her episodes of nsVT. Reviewing prior interrogations, she has had about the same number of ventricular detections.     Her remaining battery longevity is 6 months. She leaves for [place] in Jan 2026 through April. Needs to have battery exchange prior to leaving. Her next remote is in 1 month and every month thereafter until it's in the window of time  to have her battery exchanged.    Today's EKG Interpretation: Atrial paced rhythm, rate 70 bpm, WV interval 192 ms, QRS duration 110 ms, QTc 451 ms septal infarct    VITALS  Vitals:    05/28/25 1356   BP: 116/66   Pulse: 75     Wt Readings from Last 4 Encounters:   05/28/25 75.3 kg (166 lb)   11/11/24 73.5 kg (162 lb)   10/21/24 73.5 kg (162 lb)   05/16/24 75.8 kg (167 lb)     PHYSICAL EXAM:  GENERAL:  Well developed, well nourished, in no acute distress.  NEURO/PSYCH:  No focal deficits. A&O x 3   LUNGS:  Clear to auscultation bilaterally. No wheezing, rhonci, or crackles  HEART:  RRR, no M/R/G.   EXTREMITIES:  Warm with good color, no clubbing or cyanosis.  No pitting edema.     Medical History[1]  Surgical History[2]  Social History[3]  Family History[4]  RX Allergies[5]   Current Outpatient Medications   Medication Instructions    dilTIAZem CD (CARDIZEM CD) 240 mg, oral, Daily    DULoxetine (Cymbalta) 30 mg DR capsule 1 capsule, Daily (0630)    metoprolol succinate XL (TOPROL-XL) 200 mg, oral, Nightly    multivitamin tablet 1 tablet, Daily    ramelteon (ROZEREM) 8 mg, Nightly    rivaroxaban (XARELTO) 15 mg, oral, Daily    simvastatin (Zocor) 20 mg tablet       Relevant reports:   9/3/2021 ECHO  1. The left ventricular systolic function is moderately decreased with a 35-40% estimated ejection fraction.   2. Spectral Doppler shows a pseudonormal pattern of left ventricular diastolic filling.   3. There is mitral stenosis is not seen.   4. Moderate mitral valve regurgitation.   5. There is moderate tricuspid regurgitation.   6. Aortic valve sclerosis.   7. There is mild aortic valve regurgitation.   8. There is global hypokinesis of the left ventricle with minor regional variations.    10/06/2015 ECHO  EF 50%    9/15/2015 ECHO   1. The left ventricular systolic function is mildly decreased with a 40-45% ejection fraction.   2. Basal and mid inferolateral wall is abnormal.   3. Spectral Doppler shows an abnormal  pattern of left ventricular diastolic filling.   4. There is no evidence of left ventricular hypertrophy.   5. The left atrium is moderately dilated.   6. There is no evidence of a patent foramen ovale.    8/29/2012 ECHO  EF 45-50%   Regional hypokinesis of the basal to mid anteroseptal and the apical septal segments of the left ventricle.  Mild-moderate MR  Moderate pulmonary HTN, RVSP 48mmHg  Mildly dilatated and elongated left and right atria    8/6/2019 Nuclear Stress Test  Normal. No ischemia or MI. EF 48%. Low risk.   No significant interval change since 1/11/2016.    ASSESSMENT AND PLAN    Problem List Items Addressed This Visit       History of embolic stroke in Sept 2015  Admit for multi-infarct bilateral embolic vertebrobasilar CVA (L temporal, BL occipital, BL thalamic subacute strokes). 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 Later found to have episodes of AF. Remains on Xarelto.     NSVT (nonsustained ventricular tachycardia) (Multi)  She is unaware of her episodes of nsVT. Reviewing prior interrogations, she has had about the same number of ventricular detections.     PAF (paroxysmal atrial fibrillation) (Multi) and Atrial tachycardia  No episodes of AF/AT on interrogation    Relevant Orders    ECG 12 lead (Clinic Performed)    Presence of cardiac pacemaker - Primary  Her remaining battery longevity is 6 months. She leaves for [place] in Jan 2026 through April. Needs to have battery exchange prior to leaving. Her next remote is in 1 month and every month thereafter until it's in the window of time to have her battery exchanged.    Follow up with Dr Baca in 6 months with device check. We can augment this appointment if she has her PPM battery exchanged in the next few months.     Relevant Orders    Follow Up In Cardiology    Cardiac Device Check - Remote    Cardiac Device Check - In Clinic    Cardiac Device Check - In Clinic    Chronic anticoagulation for pAF and hx of embolic strokes in 2015  On  Xarelto 15 mg daily        BENEDICTO Hawkins, DMITRY             [1]   Past Medical History:  Diagnosis Date    Cardiomyopathy     Hyperlipidemia     Hypertension     Mitral valve regurgitation     NSVT (nonsustained ventricular tachycardia) (Multi)     Other specified health status     No pertinent past medical history    PAF (paroxysmal atrial fibrillation) (Multi)     Personal history of other diseases of urinary system     History of hematuria    Sick sinus syndrome (Multi)     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    Sinus node dysfunction (Multi)     Tricuspid valve regurgitation    [2]   Past Surgical History:  Procedure Laterality Date    CT ANGIO NECK  9/17/2015    CT NECK ANGIO W AND WO IV CONTRAST 9/17/2015 [facility]    CT HEAD ANGIO W AND WO IV CONTRAST  9/15/2015    CT HEAD ANGIO W AND WO IV CONTRAST 9/15/2015 [facility] CL[facility]ICAL [facility]    HYSTERECTOMY  12/15/2017    Hysterectomy    MR HEAD ANGIO W AND WO IV CONTRAST  9/16/2015    MR HEAD ANGIO W AND WO IV CONTRAST 9/16/2015 [facility]ICAL [facility]    MR HEAD ANGIO WO IV CONTRAST  9/14/2015    MR HEAD ANGIO WO IV CONTRAST 9/14/2015 [facility]    MR NECK ANGIO W AND WO IV CONTRAST  9/16/2015    MR NECK ANGIO W AND WO IV CONTRAST 9/16/2015 [facility]ICAL [facility]    MR NECK ANGIO WO IV CONTRAST  9/14/2015    MR NECK ANGIO WO IV CONTRAST 9/14/2015 [facility]    OTHER SURGICAL HISTORY  06/16/2020    Esophagogastroduodenoscopy    OTHER SURGICAL HISTORY  04/01/2022    Colonoscopy complete for polypectomy    OTHER SURGICAL HISTORY  05/11/2020    Pacemaker insertion    OTHER SURGICAL HISTORY  05/11/2020    Appendectomy    OTHER SURGICAL HISTORY  05/11/2020    Breast biopsy excisional    TONSILLECTOMY  12/15/2017    Tonsillectomy   [3]   Social History  Tobacco Use    Smoking status: Never    Smokeless tobacco: Never   Substance Use Topics    Alcohol use: Never    Drug use: Never   [4]   Family History  Problem Relation Name Age of  Onset    Hypertension Mother      Coronary artery disease Father      Hypertension Father      Other (arteriosclerotic cardiovascular disease) Father      Diabetes Brother     [5]   Allergies  Allergen Reactions    Ace Inhibitors Cough

## 2025-05-28 NOTE — PATIENT INSTRUCTIONS
Great to see you today.   Please take your medications and or do life style behavior modifications as discussed.   Please make appointment in 6 months with Dr Baca.  Your pacemaker battery is at 6 months life. You will have remote checks monthly. Device clinic will notify you when you need new battery.  This will have to be done before you leave for [place] in Jan 2026  Please call if you have any questions or concerns.  Please go to emergency department if you have abrupt onset of chest, shortness of breath, light headedness or dizziness.

## 2025-07-01 ENCOUNTER — HOSPITAL ENCOUNTER (OUTPATIENT)
Dept: CARDIOLOGY | Facility: HOSPITAL | Age: 89
Discharge: HOME | End: 2025-07-01
Payer: MEDICARE

## 2025-07-01 DIAGNOSIS — Z95.0 PRESENCE OF CARDIAC PACEMAKER: ICD-10-CM

## 2025-08-05 ENCOUNTER — HOSPITAL ENCOUNTER (OUTPATIENT)
Dept: CARDIOLOGY | Facility: HOSPITAL | Age: 89
Discharge: HOME | End: 2025-08-05
Payer: MEDICARE

## 2025-08-05 DIAGNOSIS — I49.5 SINOATRIAL NODE DYSFUNCTION (MULTI): ICD-10-CM

## 2025-08-05 DIAGNOSIS — Z95.0 CARDIAC PACEMAKER IN SITU: ICD-10-CM

## 2025-08-05 PROCEDURE — 93296 REM INTERROG EVL PM/IDS: CPT

## 2025-08-05 PROCEDURE — 93294 REM INTERROG EVL PM/LDLS PM: CPT | Performed by: INTERNAL MEDICINE

## 2025-09-02 ENCOUNTER — HOSPITAL ENCOUNTER (OUTPATIENT)
Dept: CARDIOLOGY | Facility: HOSPITAL | Age: 89
Discharge: HOME | End: 2025-09-02
Payer: MEDICARE

## 2025-09-02 DIAGNOSIS — I49.5 SINOATRIAL NODE DYSFUNCTION (MULTI): ICD-10-CM

## 2025-09-02 DIAGNOSIS — Z95.0 CARDIAC PACEMAKER IN SITU: ICD-10-CM

## 2025-10-22 ENCOUNTER — APPOINTMENT (OUTPATIENT)
Dept: CARDIOLOGY | Facility: CLINIC | Age: 89
End: 2025-10-22
Payer: MEDICARE

## 2025-12-03 ENCOUNTER — APPOINTMENT (OUTPATIENT)
Dept: CARDIOLOGY | Facility: CLINIC | Age: 89
End: 2025-12-03
Payer: MEDICARE